# Patient Record
Sex: FEMALE | URBAN - METROPOLITAN AREA
[De-identification: names, ages, dates, MRNs, and addresses within clinical notes are randomized per-mention and may not be internally consistent; named-entity substitution may affect disease eponyms.]

---
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
## 2020-03-16 ENCOUNTER — TELEPHONE (OUTPATIENT)
Dept: NEUROLOGY | Facility: CLINIC | Age: 20
End: 2020-03-16

## 2020-04-17 ENCOUNTER — TELEPHONE (OUTPATIENT)
Dept: NEUROLOGY | Facility: CLINIC | Age: 20
End: 2020-04-17

## 2020-04-17 NOTE — TELEPHONE ENCOUNTER
Due to the COVID-19 virus a risk benefit analysis was performed by the provider and in the best interest of the patient and the facility, the patient's EMG test was cancelled. LVM to call [phone] to reschedule.

## 2020-07-28 ENCOUNTER — OFFICE VISIT (OUTPATIENT)
Dept: NEUROLOGY | Facility: CLINIC | Age: 20
End: 2020-07-28
Payer: COMMERCIAL

## 2020-07-28 DIAGNOSIS — M25.521 RIGHT ELBOW PAIN: Primary | ICD-10-CM

## 2020-07-28 NOTE — LETTER
7/28/2020       RE: Gisel Prasad  [address]  [place] [address]     Dear Colleague,    Thank you for referring your patient, Gisel Praasd, to the [facility] EMG at [facility]. Please see a copy of my visit note below.        [facility]  Electrodiagnostic Laboratory    Nerve Conduction & EMG Report          Patient:       Gisel Prasad  Patient ID:    [id number]  Gender:        Female  YOB: 2000  Age:           19 Years 7 Months      Referring Physician: Gayle Mckenzie MD    History & Examination: Ms. Prasad is a 19 year old female presenting with pain at the right elbow since January 2018.  She noted intermittent shooting pain from the elbow into the right hand not necessarily to digits 4 and 5.  Strength and sensation to light touch intact in the distal right upper extremity.  Study requested to evaluate for right ulnar neuropathy.     Techniques: Motor and sensory conduction studies were done with surface recording electrodes. EMG was done with a concentric needle electrode.     Results:    Sensory NCS    Right median and ulnar antidromic sensory nerve conduction studies were normal.    Motor NCS    Right median motor nerve conduction study recorded from APB, and right ulnar motor nerve conduction studies recorded from ADM and FDI were normal.     EMG    EMG of right FDI showed no abnormal insertional or spontaneous activity, and normal amplitude, duration, morphology, and recruitment of MUAPs.    Interpretation:    This is a normal study. There is no electrophysiological evidence of a right ulnar neuropathy.     EMG Physician:     Mayco Martinez MD  Neuromuscular Fellow    ATTENDING ADDENDUM: I was present during the entire study and directly supervised Fellow Dr Martinez who performed it. I agree with the analysis of results and interpretation as above, which I personally reviewed and edited. Abdirashid Jimenez MD       Sensory NCS       Nerve / Sites Rec. Site Onset Peak NP Amp Ref. PP Amp Dist Andrea Ref. Temp     ms ms  V  V  V cm m/s m/s  C   R MEDIAN - Dig II Anti      Wrist Dig II 2.40 3.49 60.7 10.0 121.0 14 58.4 48.0 32   R ULNAR - Dig V Anti      Wrist Dig V 2.34 3.59 53.2 8.0 110.2 12.5 53.3 48.0 32       Motor NCS      Nerve / Sites Rec. Site Lat Ref. Amp Ref. Rel Amp Dist Andrea Ref. Dur. Area Temp.     ms ms mV mV % cm m/s m/s ms %  C   R MEDIAN - APB      Wrist APB 3.54 4.40 10.6 5.0 100 6.5   6.20 100 32      Elbow APB 7.40  9.9  92.9 23 59.7 48.0 6.25 89.5 32   R ULNAR - ADM      Wrist ADM 2.97 3.50 15.3 5.0 100 8   6.20 100 32      B.Elbow ADM 5.63  13.0  84.9 17.5 65.9 48.0 6.46 90.2 32      A.Elbow ADM 7.60  12.4  81 12.5 63.2 48.0 6.41 83.8 32   R ULNAR - FDI      Wrist FDI 3.44  12.8  100    6.51 100 23.6      B.Elbow FDI 6.20  12.4  97.1 17.5 63.4  5.05 97.2 23.4      A.Elbow FDI 7.71  12.2  95.4 11.5 76.1  5.21 98 23.6       EMG Summary Table     Spontaneous MUAP Recruitment    IA Fib PSW Fasc H.F. Amp Dur. PPP Pattern   R. FIRST D INTEROSS N None None None None N N N N                      Again, thank you for allowing me to participate in the care of your patient.      Sincerely,    Abdirashid Jimenez MD

## 2020-07-28 NOTE — PROGRESS NOTES
[facility]  Electrodiagnostic Laboratory    Nerve Conduction & EMG Report          Patient:       Gisel Prasad  Patient ID:    [id number]  Gender:        Female  YOB: 2000  Age:           19 Years 7 Months      Referring Physician: Gayle Mckenzie MD    History & Examination: Ms. Prasad is a 19 year old female presenting with pain at the right elbow since January 2018.  She noted intermittent shooting pain from the elbow into the right hand not necessarily to digits 4 and 5.  Strength and sensation to light touch intact in the distal right upper extremity.  Study requested to evaluate for right ulnar neuropathy.     Techniques: Motor and sensory conduction studies were done with surface recording electrodes. EMG was done with a concentric needle electrode.     Results:    Sensory NCS    Right median and ulnar antidromic sensory nerve conduction studies were normal.    Motor NCS    Right median motor nerve conduction study recorded from APB, and right ulnar motor nerve conduction studies recorded from ADM and FDI were normal.     EMG    EMG of right FDI showed no abnormal insertional or spontaneous activity, and normal amplitude, duration, morphology, and recruitment of MUAPs.    Interpretation:    This is a normal study. There is no electrophysiological evidence of a right ulnar neuropathy.     EMG Physician:     Mayco Martinez MD  Neuromuscular Fellow    ATTENDING ADDENDUM: I was present during the entire study and directly supervised Fellow Dr Martinez who performed it. I agree with the analysis of results and interpretation as above, which I personally reviewed and edited. Abdirashid Jimenez MD       Sensory NCS      Nerve / Sites Rec. Site Onset Peak NP Amp Ref. PP Amp Dist Andrea Ref. Temp     ms ms  V  V  V cm m/s m/s  C   R MEDIAN - Dig II Anti      Wrist Dig II 2.40 3.49 60.7 10.0 121.0 14 58.4 48.0 32   R ULNAR - Dig V Anti      Wrist Dig V 2.34 3.59 53.2 8.0 110.2 12.5 53.3  48.0 32       Motor NCS      Nerve / Sites Rec. Site Lat Ref. Amp Ref. Rel Amp Dist Andrea Ref. Dur. Area Temp.     ms ms mV mV % cm m/s m/s ms %  C   R MEDIAN - APB      Wrist APB 3.54 4.40 10.6 5.0 100 6.5   6.20 100 32      Elbow APB 7.40  9.9  92.9 23 59.7 48.0 6.25 89.5 32   R ULNAR - ADM      Wrist ADM 2.97 3.50 15.3 5.0 100 8   6.20 100 32      B.Elbow ADM 5.63  13.0  84.9 17.5 65.9 48.0 6.46 90.2 32      A.Elbow ADM 7.60  12.4  81 12.5 63.2 48.0 6.41 83.8 32   R ULNAR - FDI      Wrist FDI 3.44  12.8  100    6.51 100 23.6      B.Elbow FDI 6.20  12.4  97.1 17.5 63.4  5.05 97.2 23.4      A.Elbow FDI 7.71  12.2  95.4 11.5 76.1  5.21 98 23.6       EMG Summary Table     Spontaneous MUAP Recruitment    IA Fib PSW Fasc H.F. Amp Dur. PPP Pattern   R. FIRST D INTEROSS N None None None None N N N N